# Patient Record
Sex: FEMALE | Race: WHITE | Employment: FULL TIME | URBAN - METROPOLITAN AREA
[De-identification: names, ages, dates, MRNs, and addresses within clinical notes are randomized per-mention and may not be internally consistent; named-entity substitution may affect disease eponyms.]

---

## 2024-09-23 ENCOUNTER — TELEPHONE (OUTPATIENT)
Age: 49
End: 2024-09-23

## 2024-11-19 ENCOUNTER — OFFICE VISIT (OUTPATIENT)
Age: 49
End: 2024-11-19
Payer: COMMERCIAL

## 2024-11-19 VITALS
DIASTOLIC BLOOD PRESSURE: 73 MMHG | WEIGHT: 140 LBS | BODY MASS INDEX: 23.32 KG/M2 | SYSTOLIC BLOOD PRESSURE: 123 MMHG | TEMPERATURE: 98 F | HEIGHT: 65 IN | HEART RATE: 67 BPM

## 2024-11-19 DIAGNOSIS — G56.03 BILATERAL CARPAL TUNNEL SYNDROME: Primary | ICD-10-CM

## 2024-11-19 PROCEDURE — 99204 OFFICE O/P NEW MOD 45 MIN: CPT | Performed by: NEUROLOGICAL SURGERY

## 2024-11-19 NOTE — PROGRESS NOTES
[facility], [facility] NEUROSURGERY  [address], [address]  [facility] [address]  Dept: [phone]  Dept Fax: [phone]     Patient:  Mary Owens  YOB: 1975  Date: 11/19/24      Chief Complaint   Patient presents with    New Patient     Cervical radiculopathy, abnormal MRI           HPI:     Ms. Owens presents to the office technically as a new patient due to some difficulty with painful tingling in the hands.  I had last seen her in December 2020 when she was following up after having undergone a C6-7 disc arthroplasty procedure that I performed in October 2020.  She had done well up until earlier this year when she started a new workout regimen.  This sounds almost like a [employer] sort of exercise with a lot of different types of strength training.  She started to have difficulty with numbness in the hands bilaterally that would occasionally wake her up at night.  It also tended to be made worse by push-ups or exercises involving gripping a bar, like weight lifting.  She had a few instances of having a sensation of numbness around her mouth at this time as well.  Her symptoms peaked in May of this year, but she then made changes to her exercise regimen and the symptoms have proved substantially.  By the time of her visit today she states that she is no longer having any difficulty at all.  She did undergo some new imaging and an EMG as part of an evaluation for these symptoms.      History:     Past Medical History:   Diagnosis Date    Melanoma (HCC)      Past Surgical History:   Procedure Laterality Date    APPENDECTOMY      CERVICAL DISC ARTHROPLASTY      MASTECTOMY       No family history on file.  No current outpatient medications on file prior to visit.     No current facility-administered medications on file prior to visit.     Social History     Tobacco Use    Smoking status: Never    Smokeless

## 2024-11-19 NOTE — PROGRESS NOTES
[facility], [facility], [facility] NEUROSURGERY  [address], [address]  [place] [address]  Dept: [phone]  Dept Fax: [phone]     Patient:  Mary Owens  YOB: 1975  Date: 11/19/24      Chief Complaint   Patient presents with    New Patient     Cervical radiculopathy, abnormal MRI           HPI:     Ms. Owens returns         Physical Exam:      /73   Pulse 67   Temp 98 °F (36.7 °C)   Ht 1.651 m (5' 5\")   Wt 63.5 kg (140 lb)   BMI 23.30 kg/m²     ***    Assessment and Plan:   No diagnosis found.    ***      Electronically signed by Cristiano Mendez MD on 11/19/2024 at 3:32 PM    Please note that this chart was generated using voice recognition Dragon dictation software.  Although every effort was made to ensure the accuracy of this automated transcription, some errors in transcription may have occurred.

## 2025-05-13 ENCOUNTER — LAB (OUTPATIENT)
Dept: LAB | Age: 50
End: 2025-05-13